# Patient Record
(demographics unavailable — no encounter records)

---

## 2017-06-02 NOTE — [facility]
EXAM: Abdomen sonogram.

 

HISTORY: Right upper quadrant pain.

 

TECHNIQUE: Sonographic imaging of the abdomen was performed.

 

COMPARISON: None.

 

FINDINGS: The liver is normal in size. No focal hepatic lesion is seen. 

The common bile duct is normal in caliber. The gallbladder is 

unremarkable. The right kidney is unremarkable. The pancreas, inferior 

vena cava and aorta are partially obscured due to bowel gas. There is a 

gravid uterus containing a fetus with a heart rate of 136 bpm. There is an

anterior placenta.

 

IMPRESSION:

1. Unremarkable abdomen sonogram, with limited motion of the pancreas, 

aorta and inferior vena cava due to bowel gas.

2. Intrauterine fetus with a heart rate of 136 bpm, not formally assessed 

on the current exam.

 

Electronically signed by: Radha Rdz MD (6/2/2017 9:58 AM)

## 2020-07-31 NOTE — PHYS DOC
Past Medical History


Past Medical History:  Alcoholism, Other


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


Past Surgical History:  No Surgical History, 


Additional Past Surgical Histo:  


Smoking Status:  Current Every Day Smoker


Alcohol Use:  None


Drug Use:  Methamphetamine


Social History Narrative:  LAST USE YESTRDAY





General Adult


EDM:


Chief Complaint:  WITHDRAWL





HPI:


HPI:





Patient is a 32  year old female who presents to the emergency department with 

complaints of being in withdrawal from methamphetamines and alcohol.  Patient 

states that her symptoms are nausea, an intermittent headache, and feeling like 

her muscles are twitching.  She reports that [facility] 

[facility] and was sent here to be evaluated.  She reports that she last drank 

heavily on the evening of the  and last ate some meth at 6:00 on .  She also reports concerns of sexually transmitted infections consisting 

of gonorrhea and syphilis.  Patient states that she recently had unprotected 

intercourse with a man who told him that he had both of these conditions.  She 

denies any rashes, dysuria, hematuria, low back pain, or increased urinary 

frequency.  She states she has had some irregular vaginal discharge but denies 

any vaginal odor or vaginal bleeding.  She denies any pain at this time.





Review of Systems:


Review of Systems:


Constitutional:   Denies fever or chills. []


HENT:   Denies nasal congestion or sore throat. [] 


Respiratory:   Denies cough or shortness of breath. [] 


Cardiovascular:   Denies chest pain or edema. [] 


GI:   Denies abdominal pain, vomiting, or diarrhea; see HPI


:  Denies dysuria; see HPI. [] 


Musculoskeletal:   Denies back pain or joint pain; see HPI. [] 


Integument:   Denies rash. [] 


Neurologic:   Denies headache, focal weakness or sensory changes. [] 


Psychiatric:  Denies depression or anxiety. []





Heart Score:


Risk Factors:


Risk Factors:  DM, Current or recent (<one month) smoker, HTN, HLP, family 

history of CAD, obesity.


Risk Scores:


Score 0 - 3:  2.5% MACE over next 6 weeks - Discharge Home


Score 4 - 6:  20.3% MACE over next 6 weeks - Admit for Clinical Observation


Score 7 - 10:  72.7% MACE over next 6 weeks - Early Invasive Strategies





Current Medications:





Current Medications








 Medications


  (Trade)  Dose


 Ordered  Sig/Alem  Start Time


 Stop Time Status Last Admin


Dose Admin


 


 Azithromycin


  (Zithromax)  1,000 mg  1X  ONCE  20 12:00


 20 12:10 DC  





 


 Ceftriaxone Sodium


  (Rocephin Im)  250 mg  1X  ONCE  20 12:00


 20 12:10 DC  





 


 Multivitamins 10


 ml/Thiamine HCl


 100 mg/Folic Acid


 1 mg/Sodium


 Chloride  1,011.2 ml


  @ 1,000.088


 mls/hr  1X  ONCE  20 12:00


 20 13:00   





 


 Ondansetron HCl


  (Zofran)  4 mg  1X  ONCE  20 12:00


 20 12:10 DC  














Allergies:


Allergies:





Allergies








Coded Allergies Type Severity Reaction Last Updated Verified


 


  acetaminophen Allergy Intermediate  3/12/16 Yes


 


  haloperidol Allergy Intermediate  3/12/16 Yes


 


  hydrocodone Allergy Intermediate  3/12/16 Yes











Physical Exam:


PE:





Constitutional: Well developed, well nourished, no acute distress, non-toxic 

appearance. 


HENT: Normocephalic, atraumatic, bilateral external ears normal, nose normal; 

dry mucous membranes


Eyes: PERRLA, EOMI, conjunctiva normal, no discharge.


Neck: Normal range of motion, no stridor.


Cardiovascular: Heart rate regular rhythm


Lungs & Thorax:  Respirations even and unlabored, no retractions, no respiratory

 distress, lungs clear throughout all fields


Pelvic Exam: Chaperone present Kandice WOOD


 Abdomen:      Nontender, soft


 External Genitalia:   Normal Skin


 Speculum:      Normal vaginal mucosa, thick white cervical discharge


 Bimanual:       No adnexal masses or tenderness, No CMT


Skin: Warm, dry, no erythema, no rash. 


Extremities: No cyanosis, ROM intact, no edema. 


Neurologic: Alert and oriented X 3, no focal deficits noted. 


Psychologic: Affect normal, judgement normal, mood anxious





Current Patient Data:


Labs:





                                Laboratory Tests








Test


 20


11:13 20


11:20


 


Urine Collection Type Unknown   


 


Urine Color Yellow   


 


Urine Clarity Cloudy   


 


Urine pH


 7.5 (<5.0-8.0)


 





 


Urine Specific Gravity


 1.010


(1.000-1.030) 





 


Urine Protein


 Negative mg/dL


(NEG-TRACE) 





 


Urine Glucose (UA)


 Negative mg/dL


(NEG) 





 


Urine Ketones (Stick)


 Negative mg/dL


(NEG) 





 


Urine Blood


 Negative (NEG)


 





 


Urine Nitrite


 Positive (NEG)


 





 


Urine Bilirubin


 Negative (NEG)


 





 


Urine Urobilinogen Dipstick


 0.2 mg/dL (0.2


mg/dL) 





 


Urine Leukocyte Esterase Small (NEG)   


 


Urine RBC


 1-2 /HPF (0-2)


 





 


Urine WBC


 11-20 /HPF


(0-4) 





 


Urine Squamous Epithelial


Cells Mod /LPF  


 





 


Urine Bacteria


 Many /HPF


(0-FEW) 





 


Urine Opiates Screen Neg (NEG)   


 


Urine Methadone Screen Neg (NEG)   


 


Urine Barbiturates Neg (NEG)   


 


Urine Phencyclidine Screen Neg (NEG)   


 


Urine


Amphetamine/Methamphetamine Pos (NEG)  


 





 


Urine Benzodiazepines Screen Neg (NEG)   


 


Urine Cocaine Screen Neg (NEG)   


 


Urine Cannabinoids Screen Neg (NEG)   


 


Urine Ethyl Alcohol Neg (NEG)   


 


POC Urine HCG, Qualitative


 


 Hcg negative


(Negative)








Vital Signs:





                                   Vital Signs








  Date Time  Temp Pulse Resp B/P (MAP) Pulse Ox O2 Delivery O2 Flow Rate FiO2


 


20 11:15 98.3 95 18 111/81 (91) 99 Room Air  





 98.3       











EKG:


EKG:


[]





Radiology/Procedures:


Radiology/Procedures:


[]





Course & Med Decision Making:


Course & Med Decision Making


Pertinent Labs and Imaging studies reviewed. (See chart for details)


32-year-old female presents to the emergency department with concerns of a 

sexually transmitted infection, methamphetamine withdrawal symptoms, and alcohol

 withdrawal symptoms.


Work-up included labs, IV fluids, Ativan, and a pelvic exam.


CBC is unremarkable, CMP reveals a magnesium of 2.56 and total protein of 8.3 

otherwise unremarkable; UA is positive for nitrites, 11-20 white blood cells, 

and many bacteria; UDS is positive for amphetamines otherwise unremarkable; 

alcohol level is less than 10, salicylate and acetaminophen levels are negative.


Patient was given a banana bag, 1 mg of Ativan IV, and 4 mg of Zofran.  She 

reported feeling better after these medications.


Patient was treated prophylactically with 250 mg of IM Rocephin, and 1 g of PO 

Zithromax. Patient was instructed to avoid having intercourse until the results 

of gonorrhea and chlamydia testing are available, patient was notified that 

these results would not be available for 48 hours. If one or both of these tests

 is positive, patient needs to refrain from intercourse for approximately 1 week

 following the treatment of any current partners.  Prescriptions written for 

Keflex 500 mg p.o. twice daily x7 days for treatment of urinary tract infection 

and Flagyl 500 mg p.o. twice daily x7 days for treatment of bacterial vaginosis.


Patient discharged back to [facility] for drug and alcohol rehabilitation.





Patient verbalized an understanding of home care, medications, follow-up, and 

return to ED instructions and was in agreement with the plan of care.


[]





Dragon Disclaimer:


Dragon Disclaimer:


This electronic medical record was generated, in whole or in part, using a voice

 recognition dictation system.





Departure


Departure


Impression:  


   Primary Impression:  


   Withdrawal from methamphetamine


   Additional Impressions:  


   UTI (urinary tract infection)


   Qualified Codes:  N39.0 - Urinary tract infection, site not specified


   Alcohol withdrawal


   Qualified Codes:  F10.230 - Alcohol dependence with withdrawal, uncomplicated


   Bacterial vaginosis


   Contact with and (suspected) exposure to infections with a predominantly 

   sexual mode of transmission


Disposition:   HOME, SELF-CARE


Condition:  STABLE


Referrals:  


UNKNOWN PCP NAME (PCP)


Patient Instructions:  Alcohol Withdrawal, Easy-to-Read, Bacterial Vaginosis, 

Easy-to-Read, Methamphetamine Abuse, Complications, Sexually Transmitted 

Disease, Easy-to-Read, Urinary Tract Infection, Easy-to-Read





Additional Instructions:  


Fill prescription(s) and use as directed. Avoid bladder irritants such as 

caffeine, carbonation, and spicy foods. Increase clear fluids. I recommend that 

you go to your local health department for comprehensive sexually transmitted 

disease testing. You have been treated for a suspected gonorrhea and chlamydia. 

Avoid having intercourse until the results of gonorrhea and chlamydia testing 

are available, these results will not be available for 48 hours. If one or both 

of these tests is positive, you need to refrain from intercourse for 

approximately 1 week following the treatment of any current partners. Follow-up 

with your primary care doctor if symptoms persist, return to ER if symptoms 

worsen.


Scripts


Cephalexin (KEFLEX) 500 Mg Capsule


500 MG PO BID for 7 Days, #14 CAP 0 Refills


   Prov: CARLITA JAUREGUI         20 


Metronidazole (FLAGYL) 500 Mg Tablet


1 TAB PO BID, #14 TAB 0 Refills


   Prov: CARLITA JAUREGUI         20





Justicifation of Admission Dx:


Justifications for Admission:


Justification of Admission Dx:  N/A











CARLITA JAUREGUI APRTRES       2020 12:34

## 2020-09-03 NOTE — PHYS DOC
Past Medical History


Past Medical History:  Alcoholism, Anxiety, Other


Additional Past Medical Histor:  IV meth use, PTSD


Past Surgical History:  No Surgical History, 


Additional Past Surgical Histo:  


Smoking Status:  Current Every Day Smoker


Alcohol Use:  None


Drug Use:  Methamphetamine


Social History Narrative:  hx of methamphetamine use





General Adult


EDM:


Chief Complaint:  DENTAL PROBLEM





HPI:


HPI:





Patient is a 32  year old female who presents to the emergency department with 

complaints of increased dental pain in the right upper quadrant since last 

night.  Patient reports a history of dental caries and broken teeth in this 

area.  She denies any fever, dental abscess, drainage, facial swelling, fever, 

nausea, or vomiting.  The patient also complains of having increased problems 

with her anxiety recently.  She has been staying at [facility] for over 30 days for 

drug and alcohol program and recently received her sentence for legal troubles. 

Patient states she has been having a lot of anxiety over the last 24 hours as a 

result of this.  Patient states in the past she is taking Ativan and that helps 

to reduce her anxiety.  She requested a prescription for Ativan.  She denies any

suicidal or homicidal ideations.  She denies any recent exposure to anyone with 

known COVID.  Patient states she is also had some nasal congestion for the last 

few weeks.  She currently rates her pain a 10 out of 10 on the pain scale, she 

denies any alleviating or exacerbating factors.





Review of Systems:


Review of Systems:


Constitutional:   Denies fever or chills. []


HENT:   Reports nasal congestion; see HPI


Respiratory:   Denies cough or shortness of breath. [] 


Musculoskeletal:   Denies back pain or joint pain. [] 


Integument:   Denies rash. [] 


Neurologic:   Denies headache


Psychiatric:  Denies depression or anxiety. []





Heart Score:


Risk Factors:


Risk Factors:  DM, Current or recent (<one month) smoker, HTN, HLP, family 

history of CAD, obesity.


Risk Scores:


Score 0 - 3:  2.5% MACE over next 6 weeks - Discharge Home


Score 4 - 6:  20.3% MACE over next 6 weeks - Admit for Clinical Observation


Score 7 - 10:  72.7% MACE over next 6 weeks - Early Invasive Strategies





Allergies:


Allergies:





Allergies








Coded Allergies Type Severity Reaction Last Updated Verified


 


  acetaminophen Allergy Intermediate  3/12/16 Yes


 


  haloperidol Allergy Intermediate  3/12/16 Yes


 


  hydrocodone Allergy Intermediate  3/12/16 Yes











Physical Exam:


PE:





Constitutional: Well developed, well nourished, no acute distress, non-toxic 

appearance, anxious. []


HENT: Normocephalic, atraumatic, bilateral external ears normal, oropharynx 

moist, nose normal; posterior molars in the right upper quadrant have diffuse 

decay, no visible or palpable dental abscess, no gingival edema; mild erythema 

of the gingiva in the posterior right upper quadrant


Eyes: PERRLA, EOMI, conjunctiva normal, no discharge. [] 


Neck: Normal range of motion, supple, no stridor. [] 


Cardiovascular:Heart rate regular rhythm


Lungs & Thorax:  Respirations even and unlabored, no retractions, no respiratory

 distress


Skin: Warm, dry, no erythema, no rash. [] 


Extremities: No cyanosis, ROM intact, no edema. [] 


Neurologic: Alert and oriented X 3, normal motor function, normal sensory 

function, no focal deficits noted. []


Psychologic: Affect anxious, judgement normal, mood normal. []





Current Patient Data:


Vital Signs:





                                   Vital Signs








  Date Time  Temp Pulse Resp B/P (MAP) Pulse Ox O2 Delivery O2 Flow Rate FiO2


 


9/3/20 11:13 97.7 87 16 105/58 (74) 97 Room Air  





 97.7       











EKG:


EKG:


[]





Radiology/Procedures:


Radiology/Procedures:


[]





Course & Med Decision Making:


Course & Med Decision Making


Pertinent Labs and Imaging studies reviewed. (See chart for details)





[]





Dragon Disclaimer:


Dragon Disclaimer:


This electronic medical record was generated, in whole or in part, using a voice

 recognition dictation system.





Departure


Departure


Impression:  


   Primary Impression:  


   Dentalgia


   Additional Impressions:  


   Severe dental caries


   Anxiety


Disposition:  01 HOME, SELF-CARE


Condition:  STABLE


Referrals:  


OSBALDO REHMAN MD (PCP)


Patient Instructions:  Anxiety and Panic Attacks, Easy-to-Read, Dental Caries, 

Dental Pain, Easy-to-Read





Additional Instructions:  


Fill prescription(s) and use as directed. Follow up with dentist using the 

referral list provided. Return to the ER if symptoms worsen.


Scripts


Lorazepam (ATIVAN) 0.5 Mg Tablet


0.5 MG PO TID PRN for ANXIETY for 2 Days, #6 TAB 0 Refills


   Prov: CARLITA JAUREGUI APRN         9/3/20 


Naproxen (NAPROXEN) 500 Mg Tablet


1 TAB PO BID PRN for PAIN for 10 Days, #20 TAB 0 Refills


   Prov: CARLITA JAUREGUI         9/3/20 


Penicillin V Potassium (PENICILLIN V POTASSIUM) 500 Mg Tablet


1 TAB PO QID for 10 Days, #40 TAB 0 Refills


   Prov: CARLITA JAUREGUI         9/3/20





Justicifation of Admission Dx:


Justifications for Admission:


Justification of Admission Dx:  N/A











CARLITA JAUREGUI        Sep 3, 2020 11:32